# Patient Record
(demographics unavailable — no encounter records)

---

## 2024-12-03 NOTE — HISTORY OF PRESENT ILLNESS
[FreeTextEntry1] : 50 year female f/u for multiple medical issues  *** Dec 03, 2024 ***  feels well, offers no new c.o stopped Veozah, remains on Progesterone and estradiol, fluoxetine added menopausal symptoms are much better, but is concerned with a weight gain ( >20 lbs) as per patient diagnosed with a prediabetes staying on Synthroid 125 mcg. tolerates well, no palpitations taking Pantoprazole, but separates from her thyroid pills labs from July reviewed - TSH- 0.3  Thyroid US (9/26/24)- s/p total thyroidectomy. no suspicious findings Thyroid US (03/11/2024)- s/p total thyroidectomy. no suspicious findings  *** Jan 11, 2024 ***  more with menopausal symptoms (hot flashes, mood swings, insomnia). HRT was no recommended d/t ovarian cyst. started on Veozah about a month ago- some improvement. also, remains on Paroxetine on Synthroid 125 mcg. tolerates well, no palpitations TSH- 0.06, FT4- 1.5, neg Tg/Tg ab  *** Phone visit  Jul 31, 2023 ***  feels well, remains on Synthroid 125 mcg no anxiety, heart racing saw Dr. Monroy in June last labs and sono reviewed- TSH- 0.05, FT4- 1.4, neg Tg/ Tg ab Thyroid US (7/5/23)- s/p total tx. no susp findings  *** Feb 02, 2023 ***  feels well. no new c/o  saw Dr. Monroy in December. TSH- 0.54, FT4- 1.4, T3- 75. Dose was increased back to 125 mcg Thyr US (1/23/23)- s/p total tx with no susp findings. no JOSE  *** Oct 07, 2022 ***  feels well, lost some weight on the better diet. good energy taking Synthroid 125 mcg no recent labs  *** Aug 08, 2022 ***  just dx'ed with COVID. 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  No lymphovascular or perineural invasion.  Margins are negative for carcinoma.  No perineural invasion.  Mitotic rate less than 1 mitosis per 2 mm.  No extrathyroidal extension.  Lymph nodes (-) 0/3.  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  d/c on Synthroid 125 mcg (started last week), calcium 500 mg tid . taking her thyroid medication at 2 pm with other meds doing well post-op. no voice changes, wound is healing well  *** May 04, 2022 ***  patient returns for a follow-up and a detailed discussion No recent blood work  FNA (4/11/2022)-right lower pole 4 cm-suspicious for follicular neoplasm, Hurthle cell (oncocytic type) with cystic change. [place] 4. Thyroseq V3 GC-intermediate- high probability.  Right upper mid posterior 2.3 cm-benign follicular nodule with cystic change. Left upper pole 2.3 cm-Hurthle cell nodule ([place] 3). Thyroseq pending    HPI: Mrs Greer was diagnosed with a MNG on a routine physical exam. She was seen by Dr. Bird and was referred for a FNA of the dominant bilateral nodules FNA (9/24/21) of the RLP 3.2 cm - Hurthle Cell nodule with neg Thyroseq (low risk, no gene mutation, positive low copy alterations) and LUP 2.0 cm - Hurthle Cell nodule (Thyroseq cancelled due to insufficient material)  Denies history of neck surgery or radiation exposure to the neck area other than radiologic studies.  Family history is significant for hypothyroidism in her mother and aunts.   Currently, there are no local neck symptoms of anterior neck pain or problems with breathing, speaking, or swallowing.  Patient denies symptoms of hypothyroidism or hyperthyroidism.  Labs: TSH- 1.37, FT4- 1.2, 25D- 26 DXA (8/3/21)- L3/L4 excluded. L1-L2 (-0.5) with a z-sc (-0.5), FN (-0.1) with a z-score (+0.5) Thyroid US(6/11/21)- multiple bilateral nodules, including  RUP hypoechoic hypervascular solid  2.7 x 2.3 x 1.4 cm. RMP posterior  hypoechoic solid 2.1 x 2.2 x 1.5 cm. RMP hyperechoic solid with cystic components 1.3 x 0.9 x 0.9 cm. RLP hypoechoic solid nodule with tiny cystic component 2.8 x 3.2 x 2.1 cm. RLP isoechoic solid nodule 0.9 x 1.0 x 1.4 cm. LUMP 2.0x1.8x1.3 hypo

## 2024-12-03 NOTE — ASSESSMENT
[FreeTextEntry1] : 1. Hurthle cell carcinoma, minimally invasive. Status post total thyroidectomy. Current approaches to thyroid cancer management were discussed with patient in details. - Continue Synthroid 125 mcg daily, but might be able to lower the dose next visit - Proper intake of levothyroxine is reviewed in details, including on an empty stomach, with water only, at least one hour before taking any medications, vitamins, or supplements and three-four hours before taking iron or calcium. -We discussed current approaches to a minimally invasive Hurthle cell carcinoma. As per current guidelines it is still considered a low risk for recurrence with an estimated 3 to 6% chance. Alternative option for a whole-body scan followed by the iodine-131 remnant ablation therapy has been also reviewed. At present we mutually agreed to monitor closely with thyroglobulin levels and have a surveillance with frequent sonograms of the thyroid bed-  every 6 months for the next year, and then annually. - Next thyroid bed ultrasound in September 2025 at [facility]. - f/u with Dr. Monroy as scheduled  2. Prediabetes. Weight gain Current approaches to a prediabetes management are discussed with the patient.  Target ranges for blood sugar, blood pressure and cholesterol reviewed, and risk reduction strategies verified. Hypoglycemia precautions reviewed with the patient. Suggested extensive diabetes education program, including nutritional and diabetes teaching and evaluation. Proper dietary restrictions and exercise routines discussed.  Weight gain is contributed by a menopause and possibly SSRI. Advised to d/w gyn a potential switch to Wellbutrin. We also reviewed an early start of Metformin. Medical weight loss therapies were reviewed with the patient. At present, patient would like to focus on LSM changes  Return in 6 months, or sooner as needed.

## 2025-01-07 NOTE — HISTORY OF PRESENT ILLNESS
[de-identified] : Patient referred by Dr. Benavides for evaluation of suspicious thyroid nodule and a multinodular goiter.  Patient reports over a year history of multinodular goiter prior biopsy by Dr. Bird.  Patient sought a second opinion.  Follow-up ultrasound March 2022: Right lobe 7.2 x 3 x 3.5 cm with 4 cm lower, 1.3 cm mid, 2.3 cm mid upper and 2.9 cm upper nodules.  Left lobe 5.5 x 1.3 x 1.5 cm with 2.3 cm complex upper nodule with microcalcifications.  Patient underwent multiple biopsies April 2022: Right mid upper 2.3 cm nodule benign, right lower 4 cm nodule (changes, positive thyroseq with 50 to 60% risk of malignancy.  Left upper 2.3 cm nodule AUS, negative thyroseq.  Patient reports increasing pressure sensation in her neck, denies dysphagia, change in voice or radiation exposure.  7/18/22 Total thyroidectomy with resection substernal goiter.  path 1.5 cm hurthle cell CA..  Patient denies dysphagia, hoarseness, pain or parathesias.  denies palpitations or other symptoms. rochelle 12/2024 TG negative, US 9/2024 no evidence of recurrence. continues on 112 feeling well.  Patient denies recent illness.  I have reviewed all old and new data and available images.

## 2025-01-07 NOTE — PHYSICAL EXAM
[de-identified] : Incision healing well with mild pigment two small keloids, scar min discussed. No cervical or supraclavicular adenopathy, trachea midline, no palpable masses [Normal] : no neck adenopathy [de-identified] : Skin:  normal appearance.  no rash, nodules, vesicles, or erythema,\par  Musculoskeletal:  full range of motion and no deformities appreciated\par  Neurological:  grossly intact\par  Psychiatric:  oriented to person, place and time with appropriate affect

## 2025-01-07 NOTE — ASSESSMENT
[FreeTextEntry1] : Patient with history of thyroid cancer.  No suspicious findings on physical examination today or prior imaging.  Blood work reviewed agree with current dose, RTO 6 months. I reviewed the expected course of illness and the intent of current treatment with the patient. I have answered her questions.

## 2025-07-15 NOTE — HISTORY OF PRESENT ILLNESS
[de-identified] : Patient referred by Dr. Benavides for evaluation of suspicious thyroid nodule and a multinodular goiter.  Patient reports over a year history of multinodular goiter prior biopsy by Dr. Bird.  Patient sought a second opinion.  Follow-up ultrasound March 2022: Right lobe 7.2 x 3 x 3.5 cm with 4 cm lower, 1.3 cm mid, 2.3 cm mid upper and 2.9 cm upper nodules.  Left lobe 5.5 x 1.3 x 1.5 cm with 2.3 cm complex upper nodule with microcalcifications.  Patient underwent multiple biopsies April 2022: Right mid upper 2.3 cm nodule benign, right lower 4 cm nodule (changes, positive thyroseq with 50 to 60% risk of malignancy.  Left upper 2.3 cm nodule AUS, negative thyroseq.  Patient reports increasing pressure sensation in her neck, denies dysphagia, change in voice or radiation exposure.  7/18/22 Total thyroidectomy with resection substernal goiter.  path 1.5 cm hurthle cell CA..  Patient denies dysphagia, hoarseness, pain or parathesias.  denies palpitations or other symptoms. rochelle 12/2024 TG negative, US 9/2024 no evidence of recurrence. continues on 112 feeling well.  Scheduled for endometrial biopsy due to spotting postmenopausal.  Patient denies recent illness.  Repeat ultrasound March 2025, no evidence of recurrence.  I have reviewed all old and new data and available images.

## 2025-07-15 NOTE — ASSESSMENT
[FreeTextEntry1] : Patient with 3 year history of thyroid cancer.  No suspicious findings on physical examination today or prior imaging.  Blood work sent, patient will contact office to review.  Will continue under the care of Dr. Benavides as needed. I reviewed the expected course of illness and the intent of current treatment with the patient. I have answered her questions.

## 2025-07-15 NOTE — PHYSICAL EXAM
[de-identified] : Incision healing well , scar min discussed. No cervical or supraclavicular adenopathy, trachea midline, no palpable masses [Normal] : no neck adenopathy [de-identified] : Skin:  normal appearance.  no rash, nodules, vesicles, or erythema,\par  Musculoskeletal:  full range of motion and no deformities appreciated\par  Neurological:  grossly intact\par  Psychiatric:  oriented to person, place and time with appropriate affect

## 2025-07-28 NOTE — HISTORY OF PRESENT ILLNESS
[FreeTextEntry1] : 50 year female f/u for multiple medical issues  *** Jul 28, 2025 ***  feels well overall lost weight on a better diet, doing Pilates on Synthroid 112 mcg. Labs from 7/15/2025 reviewed-TSH-0.31, free T4-1.3, undetectable thyroglobulin with thyroglobulin antibodies-17.1 reports most recent a1c in the  6's range ( up from  5.8)  Thyroid ultrasound (3/28/2025)-status post total thyroidectomy with no suspicious findings.  No lymphadenopathy.  *** Dec 03, 2024 ***  feels well, offers no new c.o stopped Veozah, remains on Progesterone and estradiol, fluoxetine added menopausal symptoms are much better, but is concerned with a weight gain ( >20 lbs) as per patient diagnosed with a prediabetes staying on Synthroid 125 mcg. tolerates well, no palpitations taking Pantoprazole, but separates from her thyroid pills labs from July reviewed - TSH- 0.3  Thyroid US (9/26/24)- s/p total thyroidectomy. no suspicious findings Thyroid US (03/11/2024)- s/p total thyroidectomy. no suspicious findings  *** Jan 11, 2024 ***  more with menopausal symptoms (hot flashes, mood swings, insomnia). HRT was no recommended d/t ovarian cyst. started on Veozah about a month ago- some improvement. also, remains on Paroxetine on Synthroid 125 mcg. tolerates well, no palpitations TSH- 0.06, FT4- 1.5, neg Tg/Tg ab  *** Phone visit  Jul 31, 2023 ***  feels well, remains on Synthroid 125 mcg no anxiety, heart racing saw Dr. Monroy in June last labs and sono reviewed- TSH- 0.05, FT4- 1.4, neg Tg/ Tg ab Thyroid US (7/5/23)- s/p total tx. no susp findings  *** Feb 02, 2023 ***  feels well. no new c/o  saw Dr. Monroy in December. TSH- 0.54, FT4- 1.4, T3- 75. Dose was increased back to 125 mcg Thyr US (1/23/23)- s/p total tx with no susp findings. no JOSE  *** Oct 07, 2022 ***  feels well, lost some weight on the better diet. good energy taking Synthroid 125 mcg no recent labs  *** Aug 08, 2022 ***  just dx'ed with COVID. 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  No lymphovascular or perineural invasion.  Margins are negative for carcinoma.  No perineural invasion.  Mitotic rate less than 1 mitosis per 2 mm.  No extrathyroidal extension.  Lymph nodes (-) 0/3.  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  d/c on Synthroid 125 mcg (started last week), calcium 500 mg tid . taking her thyroid medication at 2 pm with other meds doing well post-op. no voice changes, wound is healing well  *** May 04, 2022 ***  patient returns for a follow-up and a detailed discussion No recent blood work  FNA (4/11/2022)-right lower pole 4 cm-suspicious for follicular neoplasm, Hurthle cell (oncocytic type) with cystic change. [place] 4. Thyroseq V3 GC-intermediate- high probability.  Right upper mid posterior 2.3 cm-benign follicular nodule with cystic change. Left upper pole 2.3 cm-Hurthle cell nodule ([place] 3). Thyroseq pending    HPI: Mrs Greer was diagnosed with a MNG on a routine physical exam. She was seen by Dr. Bird and was referred for a FNA of the dominant bilateral nodules FNA (9/24/21) of the RLP 3.2 cm - Hurthle Cell nodule with neg Thyroseq (low risk, no gene mutation, positive low copy alterations) and LUP 2.0 cm - Hurthle Cell nodule (Thyroseq cancelled due to insufficient material)  Denies history of neck surgery or radiation exposure to the neck area other than radiologic studies.  Family history is significant for hypothyroidism in her mother and aunts.   Currently, there are no local neck symptoms of anterior neck pain or problems with breathing, speaking, or swallowing.  Patient denies symptoms of hypothyroidism or hyperthyroidism.  Labs: TSH- 1.37, FT4- 1.2, 25D- 26 DXA (8/3/21)- L3/L4 excluded. L1-L2 (-0.5) with a z-sc (-0.5), FN (-0.1) with a z-score (+0.5) Thyroid US(6/11/21)- multiple bilateral nodules, including  RUP hypoechoic hypervascular solid  2.7 x 2.3 x 1.4 cm. RMP posterior  hypoechoic solid 2.1 x 2.2 x 1.5 cm. RMP hyperechoic solid with cystic components 1.3 x 0.9 x 0.9 cm. RLP hypoechoic solid nodule with tiny cystic component 2.8 x 3.2 x 2.1 cm. RLP isoechoic solid nodule 0.9 x 1.0 x 1.4 cm. LUMP 2.0x1.8x1.3 hypo

## 2025-07-28 NOTE — ASSESSMENT
[FreeTextEntry1] : 1. Hurthle cell carcinoma, minimally invasive. Status post total thyroidectomy. Current approaches to thyroid cancer management were discussed with patient in details. - Continue Synthroid 112 mcg daily, but might be able to lower the dose next visit - Proper intake of levothyroxine is reviewed in details, including on an empty stomach, with water only, at least one hour before taking any medications, vitamins, or supplements and three-four hours before taking iron or calcium. -We discussed current approaches to a minimally invasive Hurthle cell carcinoma. As per current guidelines it is still considered a low risk for recurrence with an estimated 3 to 6% chance. Alternative option for a whole-body scan followed by the iodine-131 remnant ablation therapy has been also reviewed. At present we mutually agreed to monitor closely with thyroglobulin levels and have a surveillance with frequent sonograms of the thyroid bed-  every 6 months for the next year, and then annually. - Next thyroid bed ultrasound in March 2026 at [facility]. - f/u with Dr. Monroy as scheduled  2. Prediabetes.  Current approaches to a prediabetes management are discussed with the patient.  Target ranges for blood sugar, blood pressure and cholesterol reviewed, and risk reduction strategies verified. Hypoglycemia precautions reviewed with the patient. Suggested extensive diabetes education program, including nutritional and diabetes teaching and evaluation. Proper dietary restrictions and exercise routines discussed.  Weight gain is contributed by a menopause and possibly SSRI. Advised to d/w gyn a potential switch to Wellbutrin. We again reviewed an early start of Metformin- patient agreed to start on Metformin  mg with dinner. Medical weight loss therapies were reviewed with the patient. At present, patient would like to continue with her LSM changes  Return in 6 months, or sooner as needed. Labs in between with PCP